# Patient Record
Sex: MALE | Race: WHITE | NOT HISPANIC OR LATINO
[De-identification: names, ages, dates, MRNs, and addresses within clinical notes are randomized per-mention and may not be internally consistent; named-entity substitution may affect disease eponyms.]

---

## 2019-02-04 PROBLEM — Z00.00 ENCOUNTER FOR PREVENTIVE HEALTH EXAMINATION: Status: ACTIVE | Noted: 2019-02-04

## 2019-02-18 ENCOUNTER — LABORATORY RESULT (OUTPATIENT)
Age: 49
End: 2019-02-18

## 2019-02-18 ENCOUNTER — APPOINTMENT (OUTPATIENT)
Dept: CARDIOLOGY | Facility: CLINIC | Age: 49
End: 2019-02-18
Payer: COMMERCIAL

## 2019-02-18 VITALS
SYSTOLIC BLOOD PRESSURE: 133 MMHG | WEIGHT: 248 LBS | HEIGHT: 72 IN | BODY MASS INDEX: 33.59 KG/M2 | DIASTOLIC BLOOD PRESSURE: 85 MMHG | RESPIRATION RATE: 15 BRPM | HEART RATE: 93 BPM

## 2019-02-18 DIAGNOSIS — Z86.79 PERSONAL HISTORY OF OTHER DISEASES OF THE CIRCULATORY SYSTEM: ICD-10-CM

## 2019-02-18 DIAGNOSIS — Z87.891 PERSONAL HISTORY OF NICOTINE DEPENDENCE: ICD-10-CM

## 2019-02-18 DIAGNOSIS — Z82.49 FAMILY HISTORY OF ISCHEMIC HEART DISEASE AND OTHER DISEASES OF THE CIRCULATORY SYSTEM: ICD-10-CM

## 2019-02-18 DIAGNOSIS — R06.09 OTHER FORMS OF DYSPNEA: ICD-10-CM

## 2019-02-18 PROCEDURE — 93015 CV STRESS TEST SUPVJ I&R: CPT

## 2019-02-18 PROCEDURE — 93306 TTE W/DOPPLER COMPLETE: CPT

## 2019-02-18 PROCEDURE — ZZZZZ: CPT

## 2019-02-18 PROCEDURE — 99242 OFF/OP CONSLTJ NEW/EST SF 20: CPT | Mod: 25

## 2019-02-18 RX ORDER — LISINOPRIL AND HYDROCHLOROTHIAZIDE TABLETS 20; 12.5 MG/1; MG/1
20-12.5 TABLET ORAL DAILY
Qty: 90 | Refills: 1 | Status: ACTIVE | COMMUNITY
Start: 2019-02-18

## 2019-02-18 RX ORDER — ASCORBIC ACID 125 MG
100 TABLET,CHEWABLE ORAL
Refills: 0 | Status: ACTIVE | COMMUNITY
Start: 2019-02-18

## 2019-02-18 RX ORDER — OMEGA-3/DHA/EPA/FISH OIL 300-1000MG
1000 CAPSULE ORAL DAILY
Refills: 0 | Status: ACTIVE | COMMUNITY
Start: 2019-02-18

## 2019-03-03 PROBLEM — R06.09 DYSPNEA ON EXERTION: Status: ACTIVE | Noted: 2019-02-18

## 2019-05-06 ENCOUNTER — APPOINTMENT (OUTPATIENT)
Dept: CARDIOLOGY | Facility: CLINIC | Age: 49
End: 2019-05-06

## 2021-12-27 ENCOUNTER — TRANSCRIPTION ENCOUNTER (OUTPATIENT)
Age: 51
End: 2021-12-27

## 2022-01-01 ENCOUNTER — TRANSCRIPTION ENCOUNTER (OUTPATIENT)
Age: 52
End: 2022-01-01

## 2022-01-05 ENCOUNTER — TRANSCRIPTION ENCOUNTER (OUTPATIENT)
Age: 52
End: 2022-01-05

## 2022-02-21 ENCOUNTER — NON-APPOINTMENT (OUTPATIENT)
Age: 52
End: 2022-02-21

## 2022-02-21 ENCOUNTER — APPOINTMENT (OUTPATIENT)
Dept: CARDIOLOGY | Facility: CLINIC | Age: 52
End: 2022-02-21
Payer: COMMERCIAL

## 2022-02-21 VITALS
WEIGHT: 297 LBS | BODY MASS INDEX: 40.23 KG/M2 | TEMPERATURE: 98.4 F | HEART RATE: 76 BPM | HEIGHT: 72 IN | DIASTOLIC BLOOD PRESSURE: 85 MMHG | RESPIRATION RATE: 16 BRPM | OXYGEN SATURATION: 95 % | SYSTOLIC BLOOD PRESSURE: 134 MMHG

## 2022-02-21 DIAGNOSIS — R01.1 CARDIAC MURMUR, UNSPECIFIED: ICD-10-CM

## 2022-02-21 DIAGNOSIS — I10 ESSENTIAL (PRIMARY) HYPERTENSION: ICD-10-CM

## 2022-02-21 DIAGNOSIS — E78.00 PURE HYPERCHOLESTEROLEMIA, UNSPECIFIED: ICD-10-CM

## 2022-02-21 PROCEDURE — 99203 OFFICE O/P NEW LOW 30 MIN: CPT

## 2022-02-21 PROCEDURE — 93000 ELECTROCARDIOGRAM COMPLETE: CPT

## 2022-02-21 RX ORDER — TURMERIC 400 MG
400 CAPSULE ORAL
Refills: 0 | Status: COMPLETED | COMMUNITY
Start: 2019-02-18 | End: 2022-02-21

## 2022-02-21 RX ORDER — B-COMPLEX WITH VITAMIN C
100 TABLET ORAL DAILY
Refills: 0 | Status: COMPLETED | COMMUNITY
Start: 2019-02-18 | End: 2022-02-21

## 2022-02-21 NOTE — DISCUSSION/SUMMARY
[FreeTextEntry1] : This is a 51-year-old male with past medical history significant for hypertension, hyperlipidemia, status post tonsillectomy, who comes in for cardiac/lipid consultation.  He denies chest pain, shortness of breath, dizziness or syncope.  He has no history of rheumatic fever.  He does not drink excessive caffeine or alcohol.\par His cardiac risk factors include hypertension, and hyperlipidemia.\par This patient's 10-year ASCVD risk score is 6.3%\par Electrocardiogram done February 21, 2022 demonstrate normal sinus rhythm rate 76 bpm is otherwise unremarkable.\par After 3 months his lipid panel will be repeated, and I would recommend the patient have a coronary artery calcium score to further risk stratify this individual.\par Blood work done by his primary care physician December 16, 2021 demonstrated cholesterol 266, LDL calculated 194 mg/dL, HDL of 48 mg/dL, triglycerides 120 mg/dL, and potassium of 5.0.\par This represents a significant increase in his LDL cholesterol from previous measurements.\par He does not wish to be on lipid-lowering therapy and prefers to maintain a proper diet.\par I recommended he work with our registered dietitian, and have repeat lipid panel done in 3 months.

## 2022-02-21 NOTE — REASON FOR VISIT
[CV Risk Factors and Non-Cardiac Disease] : CV risk factors and non-cardiac disease [Hyperlipidemia] : hyperlipidemia [FreeTextEntry1] : Is a 51-year-old white male with past medical history significant for hypertension, hyperlipidemia, who comes in for cardiac/lipid consultation.\par The patient has been seen by his primary care physician and now has an LDL of greater than 190 mg/dL.  He denies chest pain, shortness of breath, dizziness or syncope.  He may get occasional dyspnea on exertion.

## 2022-02-21 NOTE — PHYSICAL EXAM
[Well Developed] : well developed [Well Nourished] : well nourished [No Acute Distress] : no acute distress [Normal Conjunctiva] : normal conjunctiva [Normal Venous Pressure] : normal venous pressure [No Carotid Bruit] : no carotid bruit [Normal S1, S2] : normal S1, S2 [No Rub] : no rub [5th Left ICS - MCL] : palpated at the 5th LICS in the midclavicular line [Normal] : normal [No Precordial Heave] : no precordial heave was noted [Normal Rate] : normal [Normal S1] : normal S1 [Rhythm Regular] : regular [Normal S2] : normal S2 [No Gallop] : no gallop heard [I] : a grade 1 [No Pitting Edema] : no pitting edema present [2+] : left 2+ [No Abnormalities] : the abdominal aorta was not enlarged and no bruit was heard [Clear Lung Fields] : clear lung fields [Good Air Entry] : good air entry [No Respiratory Distress] : no respiratory distress  [Soft] : abdomen soft [Non Tender] : non-tender [No Masses/organomegaly] : no masses/organomegaly [Normal Bowel Sounds] : normal bowel sounds [Normal Gait] : normal gait [No Edema] : no edema [No Cyanosis] : no cyanosis [No Clubbing] : no clubbing [No Varicosities] : no varicosities [No Rash] : no rash [No Skin Lesions] : no skin lesions [Moves all extremities] : moves all extremities [Normal Speech] : normal speech [No Focal Deficits] : no focal deficits [Alert and Oriented] : alert and oriented [Normal memory] : normal memory [S3] : no S3 [S4] : no S4 [Right Carotid Bruit] : no bruit heard over the right carotid [Left Carotid Bruit] : no bruit heard over the left carotid [Left Femoral Bruit] : no bruit heard over the left femoral artery [Right Femoral Bruit] : no bruit heard over the right femoral artery

## 2022-06-27 ENCOUNTER — APPOINTMENT (OUTPATIENT)
Dept: CARDIOLOGY | Facility: CLINIC | Age: 52
End: 2022-06-27

## 2025-09-15 ENCOUNTER — APPOINTMENT (OUTPATIENT)
Dept: OTOLARYNGOLOGY | Facility: CLINIC | Age: 55
End: 2025-09-15
Payer: COMMERCIAL

## 2025-09-15 ENCOUNTER — NON-APPOINTMENT (OUTPATIENT)
Age: 55
End: 2025-09-15

## 2025-09-15 VITALS — HEIGHT: 72 IN | BODY MASS INDEX: 40.23 KG/M2 | WEIGHT: 297 LBS

## 2025-09-15 DIAGNOSIS — J38.7 OTHER DISEASES OF LARYNX: ICD-10-CM

## 2025-09-15 DIAGNOSIS — R07.0 PAIN IN THROAT: ICD-10-CM

## 2025-09-15 PROCEDURE — 99204 OFFICE O/P NEW MOD 45 MIN: CPT | Mod: 25

## 2025-09-15 PROCEDURE — 31579 LARYNGOSCOPY TELESCOPIC: CPT

## 2025-09-15 RX ORDER — BECLOMETHASONE DIPROPIONATE HFA 80 UG/1
80 AEROSOL, METERED RESPIRATORY (INHALATION) TWICE DAILY
Qty: 1 | Refills: 4 | Status: ACTIVE | COMMUNITY
Start: 2025-09-15 | End: 1900-01-01

## 2025-09-15 RX ORDER — PANTOPRAZOLE 40 MG/1
TABLET, DELAYED RELEASE ORAL
Refills: 0 | Status: ACTIVE | COMMUNITY